# Patient Record
Sex: FEMALE | Race: WHITE | NOT HISPANIC OR LATINO | URBAN - NONMETROPOLITAN AREA
[De-identification: names, ages, dates, MRNs, and addresses within clinical notes are randomized per-mention and may not be internally consistent; named-entity substitution may affect disease eponyms.]

---

## 2017-06-20 PROBLEM — O34.219 PREVIOUS CESAREAN DELIVERY AFFECTING PREGNANCY: Status: ACTIVE | Noted: 2017-06-20

## 2017-06-20 PROBLEM — Z3A.39 39 WEEKS GESTATION OF PREGNANCY: Status: ACTIVE | Noted: 2017-06-20

## 2017-06-22 PROBLEM — O34.219 PREVIOUS CESAREAN DELIVERY AFFECTING PREGNANCY: Status: RESOLVED | Noted: 2017-06-20 | Resolved: 2017-06-22

## 2017-06-22 PROBLEM — Z3A.39 39 WEEKS GESTATION OF PREGNANCY: Status: RESOLVED | Noted: 2017-06-20 | Resolved: 2017-06-22

## 2023-02-01 DIAGNOSIS — M65.842 OTHER SYNOVITIS AND TENOSYNOVITIS, LEFT HAND: Primary | ICD-10-CM

## 2023-02-02 ENCOUNTER — CLINICAL SUPPORT (OUTPATIENT)
Dept: REHABILITATION | Facility: HOSPITAL | Age: 38
End: 2023-02-02
Payer: MEDICAID

## 2023-02-02 DIAGNOSIS — M65.842 OTHER SYNOVITIS AND TENOSYNOVITIS, LEFT HAND: Primary | ICD-10-CM

## 2023-02-02 PROCEDURE — 97166 OT EVAL MOD COMPLEX 45 MIN: CPT

## 2023-02-02 NOTE — PLAN OF CARE
"Patient: Allison Dailey  Date of Evaluation: 02/02/2023  Referring Physician: Damon Lou IV  Diagnosis:   Encounter Diagnosis   Name Primary?    Other synovitis and tenosynovitis, left hand Yes       Referral Orders: Eval and treat    Start Time: 11:00  End Time: 11:35  Total Time: 35 min    Age: 37 y.o.  Sex: female  Hand dominance: right    Occupation: Works at [employer]  Working presently: Yes    Date of Injury: Dec 22, 2023  Involved areas: left wrist    Mechanism of Injury: unknown  No past medical history on file.  Current Outpatient Medications   Medication Sig    naproxen (NAPROSYN) 500 MG tablet Take 1 tablet (500 mg total) by mouth every 8 (eight) hours as needed (cramping or mild pain 1-3/10 scale).    PRENATAL VIT CALC,IRON,FOLIC (PRENATAL VITAMIN ORAL) Take 1 tablet by mouth.     No current facility-administered medications for this visit.     Review of patient's allergies indicates:  No Known Allergies    Subjective  Allison reports "It hurts and limits my ROM"    Functional Pain Scale Rating 0-10: 2/10  Location: wrists  Description: Aching  Activities which increase pain: Extension and Flexing  Activities which decrease pain: relaxation    Allison's goals for therapy are: Decrease pain, Improve ROM, Improve strenght, Improve , and Improve functional hand use      Objective    Observation: Skin intact    Range of Motion: left Active    Wrist Ext/Flex with fingers flexed: 63/33  Wrist Ext/Flex with fingers extended: 20/75  Wrist RD/UD: WFL  Supination/Pronation:WFL  Elbow extension/flexion: WFL     Strength: (FRIDA Dynamometer in lbs.) Average 3 trials, Position II  Not tested due to pain    Fine Bella Coordination Tests:   9 hole Peg Test R) 11.9 sec   L) 15 sec    Quick Dash: 22.7%    Functional Limitations: Patient presents with the following functional Limitations:   Self Care / ADL: Dressing, Tying shoes, and Buttons/Fastners    Work/Activities: Gripping    Leisure: Crafts and Drumming "     Treatment included: OT evaluation and instruction in written HEP will be given on first treatment day      Assessment  Treatment Diagnosis/ Problem List LUE Decreased ROM, Decreased  strength, Decreased functional hand use, Increased pain, and Diminished/Impaired Coordination    Short Term Goals:   1) Patient to be IND with HEP and modalities for pain/edema managment.  2) Increase ROM 3-5 degrees to increase functional hand use for ADLs/work/leisure activities.  3) Increase  strength 3-5 lbs. to improve functional grasp for ADLs/work/leisure activities.  4) Decrease complaints of pain to  1 out of 10 to increase functional hand use for ADL/work/leisure activities.  5) Pt will increase ability to move/handle objects AEB decreased score on QuickDASH <=11.4 % (currently 22.7%)       Plan  Allison Dailey to be treated by Occupational Therapy 2 times per week for 6 weeks during the certification period from 2/6/2023 to 3/17/2023 to achieve the established goals.     Treatment to include: Paraffin, Fluidotherapy, Manual therapy/joint mobilizations, Modalities for pain management, US 3 mhz, Strengthening, Edema Control, Electrical Modalities, Joint Protection, and Energy Conservation, as well as any other treatments deemed necessary based on the patient's needs or progress.     I certify the need for these services furnished under this plan of treatment and while under my care.     ____________________________________                         __________________  Physician/Referring Practitioner                                               Date of Signature

## 2023-02-06 ENCOUNTER — CLINICAL SUPPORT (OUTPATIENT)
Dept: REHABILITATION | Facility: HOSPITAL | Age: 38
End: 2023-02-06
Payer: MEDICAID

## 2023-02-06 DIAGNOSIS — M65.842 OTHER SYNOVITIS AND TENOSYNOVITIS, LEFT HAND: Primary | ICD-10-CM

## 2023-02-06 PROCEDURE — 97530 THERAPEUTIC ACTIVITIES: CPT

## 2023-02-06 NOTE — PROGRESS NOTES
"Patient:  Allison Dailey  Clinic #:  [id number]   Date of Note: 02/06/2023   Referring Physician:  Order, Paper  Diagnosis:    Encounter Diagnosis   Name Primary?    Other synovitis and tenosynovitis, left hand Yes        Start Time: 2:00 pm  End Time: 3:00 pm  Total Time: 60 min      Subjective:   "Its the same"  Pain: 2 out of 10     Objective:   Patient seen by OT this session. Treatment  consist of the following:  Therapeutic activities    Treatment: Therapeutic activities x 60 min    Paraffin with HP x 10  Hand stretches in flexion and extension 3 x 5 for increased ROM  Wrist massage for increased ROM  CP x 10 for decreased swelling     Assessment:  Pt tolerated therapy well this day with increased rest breaks due to pain. Pt with no complaints of increased pain but was seen with facial grimacing during interventions. Pt skin intact without redness post modalities. Continue OT POC at next visit.  Pt will continue to benefit from skilled OT intervention.    Patient continues to demonstrate limitation  with  ROM, Joint mobility, Stiffness, Decreased fine motor coordination, Decreased functional use, Decreased strength, and Continued pain    Assessment  Treatment Diagnosis/ Problem List LUE Decreased ROM, Decreased  strength, Decreased functional hand use, Increased pain, and Diminished/Impaired Coordination     Short Term Goals:   1) Patient to be IND with HEP and modalities for pain/edema managment.  2) Increase ROM 3-5 degrees to increase functional hand use for ADLs/work/leisure activities.  3) Increase  strength 3-5 lbs. to improve functional grasp for ADLs/work/leisure activities.  4) Decrease complaints of pain to  1 out of 10 to increase functional hand use for ADL/work/leisure activities.  5) Pt will increase ability to move/handle objects AEB decreased score on QuickDASH <=11.4 % (currently 22.7%)         Plan  Allison Dailey to be treated by Occupational Therapy 2 times per week for 6 weeks during " the certification period from 2/6/2023 to 3/17/2023 to achieve the established goals.      Treatment to include: Paraffin, Fluidotherapy, Manual therapy/joint mobilizations, Modalities for pain management, US 3 mhz, Strengthening, Edema Control, Electrical Modalities, Joint Protection, and Energy Conservation, as well as any other treatments deemed necessary based on the patient's needs or progress.

## 2023-02-10 ENCOUNTER — CLINICAL SUPPORT (OUTPATIENT)
Dept: REHABILITATION | Facility: HOSPITAL | Age: 38
End: 2023-02-10
Payer: MEDICAID

## 2023-02-10 DIAGNOSIS — M65.842 OTHER SYNOVITIS AND TENOSYNOVITIS, LEFT HAND: Primary | ICD-10-CM

## 2023-02-10 PROCEDURE — 97530 THERAPEUTIC ACTIVITIES: CPT | Mod: CO

## 2023-02-10 NOTE — PROGRESS NOTES
"Patient:  Allison Dailey  [facility] #:  [id number]   Date of Note: 02/10/2023   Referring Physician:  Order, Paper  Diagnosis:    No diagnosis found.       Start Time: 10:00 am  End Time: 11:00 am  Total Time: 60 min      Subjective:   "No pain as long as I don't do to much with it."  Pain: 0 out of 10     Objective:   Patient seen by FORD this session. Treatment  consist of the following:  Therapeutic activities    Treatment: Therapeutic activities x 45 min and Manual Therapy x 15  - Patient completed wrist flexion and extension with MCP, PIP, and DIP in flexion seated for 2 x 10 reps to increase range of motion and tendon gliding.  -Patient completed prayer and reverse prayer stretch for 3 sec hold for 2 x 10 sets seated to increase range of motion in wrist and facilitate sheath gliding within tendons.  -Pt tolerated retrograde massage on dorsal side hand and wrist and for 15 min for reduction of edema in lymphatic system utilizing retrograde soft tissue massage to optimize range of motion and impact function.  -Patient performed 2 x 10 sets of tendon glides seated with visual demonstration to increase gliding of sheaths with in tendon and range of motion of wrist.   -Patient engaged in cryotherapy for 8 min while being educated on home exercise program and tendon glides ex/ax for pain relief, decreased edema,  decreased muscle spasms, decreased inflammation, decreased metabolic activity of tissues, and reduced nerve conduction velocity.      Assessment:  Patient reported no pain pre-session, however once she starts using it pain slightly increases. Patient reported during prayer and reverse prayer stretch, pain goes up to 7/10. FORD educated on staying within pain tolerance to not inflame the tendon worse. Patient unable to get to full range of motion during any exercise/activity. While patient on ice pack, she was educated on continuing home exercise program and adding tendon glides. Tendon glides hand out given to " patient. Patient shows good carry over skills and compliance with home exercise program and tendon glides. Pt skin intact without redness post modalities. Continue OT POC at next visit.    Pt will continue to benefit from skilled OT intervention.    Patient continues to demonstrate limitation  with  ROM, Joint mobility, Stiffness, Decreased fine motor coordination, Decreased functional use, Decreased strength, and Continued pain    Assessment  Treatment Diagnosis/ Problem List LUE Decreased ROM, Decreased  strength, Decreased functional hand use, Increased pain, and Diminished/Impaired Coordination     Short Term Goals:   1) Patient to be IND with HEP and modalities for pain/edema managment.  2) Increase ROM 3-5 degrees to increase functional hand use for ADLs/work/leisure activities.  3) Increase  strength 3-5 lbs. to improve functional grasp for ADLs/work/leisure activities.  4) Decrease complaints of pain to  1 out of 10 to increase functional hand use for ADL/work/leisure activities.  5) Pt will increase ability to move/handle objects AEB decreased score on QuickDASH <=11.4 % (currently 22.7%)        Plan  Allison Dailey to be treated by Occupational Therapy 2 times per week for 6 weeks during the certification period from 2/6/2023 to 3/17/2023 to achieve the established goals.      Treatment to include: Paraffin, Fluidotherapy, Manual therapy/joint mobilizations, Modalities for pain management, US 3 mhz, Strengthening, Edema Control, Electrical Modalities, Joint Protection, and Energy Conservation, as well as any other treatments deemed necessary based on the patient's needs or progress.

## 2023-02-13 ENCOUNTER — CLINICAL SUPPORT (OUTPATIENT)
Dept: REHABILITATION | Facility: HOSPITAL | Age: 38
End: 2023-02-13
Payer: MEDICAID

## 2023-02-13 DIAGNOSIS — M65.842 OTHER SYNOVITIS AND TENOSYNOVITIS, LEFT HAND: Primary | ICD-10-CM

## 2023-02-13 PROCEDURE — 97530 THERAPEUTIC ACTIVITIES: CPT | Mod: CO

## 2023-02-13 NOTE — PROGRESS NOTES
"Patient:  Allison Dailey  [facility] #:  [id number]   Date of Note: 02/13/2023   Referring Physician:  Order, Paper  Diagnosis:    No diagnosis found.       Start Time: 10:00 am  End Time: 11:05 am  Total Time: 65 min      Subjective:   "Its good"  Pain: 1 out of 10     Objective:   Patient seen by FORD this session. Treatment  consist of the following:  Therapeutic activities    Treatment: Therapeutic activities x 45 min and Manual Therapy x 15  - Applied paraffin to (L) hand to increase muscle elasticity and improve blood flow prior to beginning PROM and therapeutic interventions   - Patient completed wrist flexion and extension with MCP, PIP, and DIP in flexion seated for 2 x 10 reps to increase range of motion and tendon gliding.  -Patient completed prayer and reverse prayer stretch for 3 sec hold for 2 x 10 sets seated to increase range of motion in wrist and facilitate sheath gliding within tendons.  -Pt tolerated retrograde massage on dorsal side hand and wrist and for 15 min for reduction of edema in lymphatic system utilizing retrograde soft tissue massage to optimize range of motion and impact function.  -Patient performed 2 x 10 sets of tendon glides seated with visual demonstration to increase gliding of sheaths with in tendon and range of motion of wrist.        Assessment:  Patient tolerated session well this day. Patient reported very little pain this day. Patient continues to report the worse pain is during reverse prayer stretch this day. Patient verbally stated pain goes up to 7/10 during that stretch. Patient unable to get full range of motion during any stretches this day. Patient re-educated on home exercise program and the importance of prolonged stretch to increase range of motion. FORD measured during passive wrist extension, patient was able to reach 82 degrees; and with active wrist extension, patient was able to get to 55 degrees. Patient range of motion increased slowly from evaluation. Patient " shows good carry over skills and compliance with home exercise program and tendon glides. Pt skin intact without redness post modalities. Continue OT POC at next visit. Occupational therapist /FORD collaboration to discuss POC, improvements, and d/c planning on todays date.      Pt will continue to benefit from skilled OT intervention.    Patient continues to demonstrate limitation  with  ROM, Joint mobility, Stiffness, Decreased fine motor coordination, Decreased functional use, Decreased strength, and Continued pain    Assessment  Treatment Diagnosis/ Problem List LUE Decreased ROM, Decreased  strength, Decreased functional hand use, Increased pain, and Diminished/Impaired Coordination     Short Term Goals:   1) Patient to be IND with HEP and modalities for pain/edema managment.  2) Increase ROM 3-5 degrees to increase functional hand use for ADLs/work/leisure activities.  3) Increase  strength 3-5 lbs. to improve functional grasp for ADLs/work/leisure activities.  4) Decrease complaints of pain to  1 out of 10 to increase functional hand use for ADL/work/leisure activities.  5) Pt will increase ability to move/handle objects AEB decreased score on QuickDASH <=11.4 % (currently 22.7%)        Plan  Allison Dailey to be treated by Occupational Therapy 2 times per week for 6 weeks during the certification period from 2/6/2023 to 3/17/2023 to achieve the established goals.      Treatment to include: Paraffin, Fluidotherapy, Manual therapy/joint mobilizations, Modalities for pain management, US 3 mhz, Strengthening, Edema Control, Electrical Modalities, Joint Protection, and Energy Conservation, as well as any other treatments deemed necessary based on the patient's needs or progress.

## 2023-02-15 ENCOUNTER — CLINICAL SUPPORT (OUTPATIENT)
Dept: REHABILITATION | Facility: HOSPITAL | Age: 38
End: 2023-02-15
Payer: MEDICAID

## 2023-02-15 DIAGNOSIS — M65.842 OTHER SYNOVITIS AND TENOSYNOVITIS, LEFT HAND: Primary | ICD-10-CM

## 2023-02-15 PROCEDURE — 97530 THERAPEUTIC ACTIVITIES: CPT

## 2023-02-15 NOTE — PROGRESS NOTES
"Patient:  Allison Dailey  [facility] #:  [id number]   Date of Note: 02/15/2023   Referring Physician:  Order, Paper  Diagnosis:    No diagnosis found.       Start Time: 10:00 am  End Time: 11:00 am  Total Time: 60 min      Subjective:   "No Pain"  Pain: 1 out of 10     Objective:   Patient seen by OT this session. Treatment  consist of the following:  Therapeutic activities    Treatment: Therapeutic activities x 60 min     - Patient completed wrist flexion and extension with MCP, PIP, and DIP in flexion seated for 2 x 10 reps to increase range of motion and tendon gliding.  -Patient completed prayer and reverse prayer stretch for 3 sec hold for 2 x 10 sets seated to increase range of motion in wrist and facilitate sheath gliding within tendons.  -Pt tolerated retrograde massage on dorsal side hand and wrist and for 15 min for reduction of edema in lymphatic system utilizing retrograde soft tissue massage to optimize range of motion and impact function.  -Patient performed 2 x 10 sets of tendon glides seated with visual demonstration to increase gliding of sheaths with in tendon and range of motion of wrist.   -Cold pack while completing hand ROM     Assessment:  Patient tolerated session well this day. Patient unable to get full range of motion during any stretches this day. Patient shows good carry over skills and compliance with home exercise program and tendon glides. Pt skin intact without redness post modalities. Continue OT POC at next visit.  Pt will continue to benefit from skilled OT intervention.    Patient continues to demonstrate limitation  with  ROM, Joint mobility, Stiffness, Decreased fine motor coordination, Decreased functional use, Decreased strength, and Continued pain    Assessment  Treatment Diagnosis/ Problem List LUE Decreased ROM, Decreased  strength, Decreased functional hand use, Increased pain, and Diminished/Impaired Coordination     Short Term Goals:   1) Patient to be IND with HEP and " modalities for pain/edema managment.  2) Increase ROM 3-5 degrees to increase functional hand use for ADLs/work/leisure activities.  3) Increase  strength 3-5 lbs. to improve functional grasp for ADLs/work/leisure activities.  4) Decrease complaints of pain to  1 out of 10 to increase functional hand use for ADL/work/leisure activities.  5) Pt will increase ability to move/handle objects AEB decreased score on QuickDASH <=11.4 % (currently 22.7%)        Plan  Allison Dailey to be treated by Occupational Therapy 2 times per week for 6 weeks during the certification period from 2/6/2023 to 3/17/2023 to achieve the established goals.      Treatment to include: Paraffin, Fluidotherapy, Manual therapy/joint mobilizations, Modalities for pain management, US 3 mhz, Strengthening, Edema Control, Electrical Modalities, Joint Protection, and Energy Conservation, as well as any other treatments deemed necessary based on the patient's needs or progress.

## 2023-02-20 ENCOUNTER — CLINICAL SUPPORT (OUTPATIENT)
Dept: REHABILITATION | Facility: HOSPITAL | Age: 38
End: 2023-02-20
Payer: MEDICAID

## 2023-02-20 DIAGNOSIS — M65.842 OTHER SYNOVITIS AND TENOSYNOVITIS, LEFT HAND: Primary | ICD-10-CM

## 2023-02-20 PROCEDURE — 97530 THERAPEUTIC ACTIVITIES: CPT | Mod: CO

## 2023-02-20 NOTE — PROGRESS NOTES
"Patient:  Allison Dailey  [facility] #:  [id number]   Date of Note: 02/20/2023   Referring Physician:  Order, Paper  Diagnosis:    No diagnosis found.       Start Time: 9:55 am  End Time: 10:56 am  Total Time: 61 min      Subjective:   "It's okay"  Pain: 1 out of 10     Objective:   Patient seen by LILIANA this session. Treatment  consist of the following:  Therapeutic activities    Treatment: Therapeutic activities x 11 min, Therapeutic exercise x 35 min and Manual Therapy x 15 min    - Patient completed active range of motion wrist flexion and extension with MCP, PIP, and DIP in flexion seated for 3 x 10 reps to increase range of motion and tendon gliding.  -patient tolerated passive range of motion wrist flexion with MCP, PIP, and DIP in flexion and wrist extension with digits extended for 3 x 10 sets with ~3 sec hold to increase range of motion.   -Patient completed prayer and reverse prayer stretch for 3 sec hold for 3 x 10 sets seated to increase range of motion in wrist and facilitate sheath gliding within tendons.  -Pt tolerated retrograde massage on dorsal side hand and wrist and for 15 min for reduction of edema in lymphatic system utilizing retrograde soft tissue massage to optimize range of motion and impact function.  -Patient performed 3 x 10 sets of tendon glides seated with visual demonstration to increase gliding of sheaths with in tendon and range of motion of wrist.   -Patient completed single digit raises for 3 x 10 sets to increase range of motion and strength.  -Cold pack while completing hand ROM     Assessment:    Patient tolerated session well this day with little increase of pain. Patient continues to report hardest stretch is reverse prayer stretch again this day. Patient unable to get full range of motion during wrist flexion with digits flexed and wrist extension with digits in extension. End feels or semi-hard. Patient shows good carry over skills and compliance with home exercise program and " tendon glides. Patient reports some relief with ice pack post session. Pt skin intact without redness post modalities. Continue OT POC at next visit.    Pt will continue to benefit from skilled OT intervention. Patient continues to demonstrate limitation  with  ROM, Joint mobility, Stiffness, Decreased fine motor coordination, Decreased functional use, Decreased strength, and Continued pain    Assessment  Treatment Diagnosis/ Problem List LUE Decreased ROM, Decreased  strength, Decreased functional hand use, Increased pain, and Diminished/Impaired Coordination     Short Term Goals:   1) Patient to be IND with HEP and modalities for pain/edema managment.  2) Increase ROM 3-5 degrees to increase functional hand use for ADLs/work/leisure activities.  3) Increase  strength 3-5 lbs. to improve functional grasp for ADLs/work/leisure activities.  4) Decrease complaints of pain to  1 out of 10 to increase functional hand use for ADL/work/leisure activities.  5) Pt will increase ability to move/handle objects AEB decreased score on QuickDASH <=11.4 % (currently 22.7%)        Plan  Allison Dailey to be treated by Occupational Therapy 2 times per week for 6 weeks during the certification period from 2/6/2023 to 3/17/2023 to achieve the established goals.      Treatment to include: Paraffin, Fluidotherapy, Manual therapy/joint mobilizations, Modalities for pain management, US 3 mhz, Strengthening, Edema Control, Electrical Modalities, Joint Protection, and Energy Conservation, as well as any other treatments deemed necessary based on the patient's needs or progress.

## 2023-02-22 ENCOUNTER — TELEPHONE (OUTPATIENT)
Dept: REHABILITATION | Facility: HOSPITAL | Age: 38
End: 2023-02-22
Payer: MEDICAID

## 2023-02-23 ENCOUNTER — CLINICAL SUPPORT (OUTPATIENT)
Dept: REHABILITATION | Facility: HOSPITAL | Age: 38
End: 2023-02-23
Payer: MEDICAID

## 2023-02-23 DIAGNOSIS — M65.842 OTHER SYNOVITIS AND TENOSYNOVITIS, LEFT HAND: Primary | ICD-10-CM

## 2023-02-23 PROCEDURE — 97530 THERAPEUTIC ACTIVITIES: CPT | Mod: CO

## 2023-02-23 NOTE — PROGRESS NOTES
"Patient:  Allison Dailey  [facility] #:  [id number]   Date of Note: 02/23/2023   Referring Physician:  Order, Paper  Diagnosis:    No diagnosis found.       Start Time: 9:56 am  End Time: 10:56 am  Total Time: 60 min      Subjective:   "It's feeling good today. I have a MD apt Monday."  Pain: 1 out of 10     Objective:   Patient seen by LILIANA this session. Treatment  consist of the following:  Therapeutic activities    Treatment: Therapeutic activities x 11 min, Therapeutic exercise x 34 min and Manual Therapy x 15 min    - Patient completed active range of motion wrist flexion and extension with MCP, PIP, and DIP in flexion seated for 3 x 10 reps to increase range of motion and tendon gliding.  -patient tolerated passive range of motion wrist flexion with MCP, PIP, and DIP in flexion and wrist extension with digits extended for 3 x 10 sets with ~3 sec hold to increase range of motion.   -Patient completed prayer and reverse prayer stretch for 3 sec hold for 3 x 10 sets seated to increase range of motion in wrist and facilitate sheath gliding within tendons.  -Pt tolerated retrograde massage on dorsal side hand and wrist and for 15 min for reduction of edema in lymphatic system utilizing retrograde soft tissue massage to optimize range of motion and impact function.  -Patient performed 3 x 10 sets of tendon glides seated with visual demonstration to increase gliding of sheaths with in tendon and range of motion of wrist.   -Patient completed single digit raises for 3 x 10 sets to increase range of motion and strength.  -Patient engaged in small swiss ball rolls for 3 x 10 reps with 5 sec hold at end range to increase range of motion.       Assessment:    Patient reported she has 1/10 pain today, however its more of a ache not pain. Patient also reports she has a MD apt Monday with ortho. Patient continues to report worse exercise this day was reverse prayer stretch, however patient reported more discomfort with wrist flexion " when all digits, MCP, PIP, and DIP joints are flexed this day. Patient reported she is also more stiff today and she could have slept wrong. Patient continues to have semi-tight end feels. Patient reported swiss ball rolls felt good and she could feel a slight pull. Patient shows good carry over skills and compliance with home exercise program and tendon glides. Patient request Biofreeze post session this day to help with pain and soreness. Pt skin intact without redness post modalities. Continue OT POC at next visit. Occupational therapist /FORD collaboration to discuss POC, improvements, and d/c planning on todays date.      Pt will continue to benefit from skilled OT intervention. Patient continues to demonstrate limitation  with  ROM, Joint mobility, Stiffness, Decreased fine motor coordination, Decreased functional use, Decreased strength, and Continued pain    Assessment  Treatment Diagnosis/ Problem List LUE Decreased ROM, Decreased  strength, Decreased functional hand use, Increased pain, and Diminished/Impaired Coordination     Short Term Goals:   1) Patient to be IND with HEP and modalities for pain/edema managment.  2) Increase ROM 3-5 degrees to increase functional hand use for ADLs/work/leisure activities.  3) Increase  strength 3-5 lbs. to improve functional grasp for ADLs/work/leisure activities.  4) Decrease complaints of pain to  1 out of 10 to increase functional hand use for ADL/work/leisure activities.  5) Pt will increase ability to move/handle objects AEB decreased score on QuickDASH <=11.4 % (currently 22.7%)        Plan  Allison Dailey to be treated by Occupational Therapy 2 times per week for 6 weeks during the certification period from 2/6/2023 to 3/17/2023 to achieve the established goals.      Treatment to include: Paraffin, Fluidotherapy, Manual therapy/joint mobilizations, Modalities for pain management, US 3 mhz, Strengthening, Edema Control, Electrical Modalities, Joint  Protection, and Energy Conservation, as well as any other treatments deemed necessary based on the patient's needs or progress.     Kenyatta FORD 2/23/23.

## 2023-02-27 ENCOUNTER — CLINICAL SUPPORT (OUTPATIENT)
Dept: REHABILITATION | Facility: HOSPITAL | Age: 38
End: 2023-02-27
Payer: MEDICAID

## 2023-02-27 DIAGNOSIS — M65.842 OTHER SYNOVITIS AND TENOSYNOVITIS, LEFT HAND: Primary | ICD-10-CM

## 2023-02-27 PROCEDURE — 97530 THERAPEUTIC ACTIVITIES: CPT | Mod: CO

## 2023-02-27 NOTE — PROGRESS NOTES
"Patient:  Allison Dailey  [facility] #:  [id number]   Date of Note: 02/27/2023   Referring Physician:  Order, Paper  Diagnosis:    Encounter Diagnosis   Name Primary?    Other synovitis and tenosynovitis, left hand Yes     Start Time: 10:00 am  End Time: 11:00 am  Total Time: 60 min    Subjective:   "Doctor apt went good."  Pain: 1 out of 10     Objective:   Patient seen by LILIANA this session. Treatment  consist of the following:  Therapeutic activities    Treatment: Therapeutic activities x 60 min  - Patient completed active range of motion wrist flexion and extension with MCP, PIP, and DIP in flexion seated for 3 x 10 reps to increase range of motion and tendon gliding.  -patient tolerated passive range of motion wrist flexion with MCP, PIP, and DIP in flexion and wrist extension with digits extended for 3 x 10 sets with ~3 sec hold to increase range of motion.   -Patient completed prayer and reverse prayer stretch for 3 sec hold for 3 x 10 sets seated to increase range of motion in wrist and facilitate sheath gliding within tendons.  -Pt tolerated retrograde massage on dorsal side hand and wrist and for 15 min for reduction of edema in lymphatic system utilizing retrograde soft tissue massage to optimize range of motion and impact function.  -Patient performed 3 x 10 sets of tendon glides seated with visual demonstration to increase gliding of sheaths with in tendon and range of motion of wrist.   -Patient completed single digit raises for 3 x 10 sets to increase range of motion and strength.  -Patient engaged in small swiss ball rolls for 3 x 10 reps with 10 sec hold at end range to increase range of motion.  -Patient completed prolonged wrist extension with 2 pound swiss ball over wedge  for 3 x 10 with 10 sec hold to increase range of motion. Patient educated to try to keep digits in extension while holding on the ball.      Assessment:    Patient tolerated session well this day. Patient reports more of an ache not " really a pain. Patient reported the MD said it's a lot of fluid, MD wants her to see a rheumatologist. Post manual massage and retrograde massage and some stretching, patient able to reach 55 degrees active of wrist flexion with digits flexed. Patient able to reach 34 degrees of active wrist extension with digits in extension. Subjectively per FORD that is getting a lot better even from last week. Patient reports manual massage helps with range of motion to decrease stiffness. Patient also reports exercise/activity getting easier. Patient educated to rub it at home to continue to decrease the swelling and stiffness. atient shows good carry over skills and compliance with home exercise program and tendon glides. Patient skin intact without redness post modalities. Continue OT POC at next visit. Occupational therapist /FORD collaboration to discuss POC, improvements, and d/c planning on todays date.    Pt will continue to benefit from skilled OT intervention. Patient continues to demonstrate limitation  with  ROM, Joint mobility, Stiffness, Decreased fine motor coordination, Decreased functional use, Decreased strength, and Continued pain    Assessment  Treatment Diagnosis/ Problem List LUE Decreased ROM, Decreased  strength, Decreased functional hand use, Increased pain, and Diminished/Impaired Coordination     Short Term Goals:   1) Patient to be IND with HEP and modalities for pain/edema managment.  2) Increase ROM 3-5 degrees to increase functional hand use for ADLs/work/leisure activities.  3) Increase  strength 3-5 lbs. to improve functional grasp for ADLs/work/leisure activities.  4) Decrease complaints of pain to  1 out of 10 to increase functional hand use for ADL/work/leisure activities.  5) Pt will increase ability to move/handle objects AEB decreased score on QuickDASH <=11.4 % (currently 22.7%)        Plan  Allison Daiely to be treated by Occupational Therapy 2 times per week for 6 weeks during the  certification period from 2/6/2023 to 3/17/2023 to achieve the established goals.      Treatment to include: Paraffin, Fluidotherapy, Manual therapy/joint mobilizations, Modalities for pain management, US 3 mhz, Strengthening, Edema Control, Electrical Modalities, Joint Protection, and Energy Conservation, as well as any other treatments deemed necessary based on the patient's needs or progress.

## 2023-03-02 ENCOUNTER — CLINICAL SUPPORT (OUTPATIENT)
Dept: REHABILITATION | Facility: HOSPITAL | Age: 38
End: 2023-03-02
Payer: MEDICAID

## 2023-03-02 DIAGNOSIS — M65.842 OTHER SYNOVITIS AND TENOSYNOVITIS, LEFT HAND: Primary | ICD-10-CM

## 2023-03-02 PROCEDURE — 97530 THERAPEUTIC ACTIVITIES: CPT | Mod: CO

## 2023-03-06 ENCOUNTER — CLINICAL SUPPORT (OUTPATIENT)
Dept: REHABILITATION | Facility: HOSPITAL | Age: 38
End: 2023-03-06
Payer: MEDICAID

## 2023-03-06 DIAGNOSIS — M65.842 OTHER SYNOVITIS AND TENOSYNOVITIS, LEFT HAND: Primary | ICD-10-CM

## 2023-03-06 PROCEDURE — 97530 THERAPEUTIC ACTIVITIES: CPT

## 2023-03-06 NOTE — PROGRESS NOTES
"Patient:  Allison Dailey  [facility] #:  [id number]   Date of Note: 03/06/2023   Referring Physician:  Order, Paper  Diagnosis:    Encounter Diagnosis   Name Primary?    Other synovitis and tenosynovitis, left hand Yes       Start Time: 10:00 pm  End Time: 11:00 pm  Total Time: 60 min    Subjective:   "Its ok"  Pain: 1 out of 10     Objective:   Patient seen by OT this session. Treatment  consist of the following:  Therapeutic activities    Treatment: Therapeutic activities x 60 min  - Patient completed active range of motion wrist flexion and extension with MCP, PIP, and DIP in flexion seated for 3 x 10 reps to increase range of motion and tendon gliding.  -patient tolerated passive range of motion wrist flexion with MCP, PIP, and DIP in flexion and wrist extension with digits extended for 3 x 10 sets with ~3 sec hold to increase range of motion.   -Patient completed prayer and reverse prayer stretch for 3 sec hold for 3 x 10 sets seated to increase range of motion in wrist and facilitate sheath gliding within tendons.  -Pt tolerated retrograde massage on dorsal side hand and wrist and for 15 min for reduction of edema in lymphatic system utilizing retrograde soft tissue massage to optimize range of motion and impact function.  -Patient performed 3 x 10 sets of tendon glides seated with visual demonstration to increase gliding of sheaths with in tendon and range of motion of wrist.   -Patient completed single digit raises with extension tool for 3 x 10 sets to increase range of motion and strength.  -Patient engaged in small swiss ball rolls for 3 x 10 reps with 10 sec hold at end range to increase range of motion.  -Patient completed prolonged wrist extension with 2 pound swiss ball over wedge  for 3 x 10 with 10 sec hold to increase range of motion. Patient educated to try to keep digits in extension while holding on the ball.   -Patient engaged in prolonged wrist extension and flexion on table top with 10 sec hold for 3 " "x 10 sets to increase range of motion.      Assessment:    Patient tolerated session well this day. Patient reported ball stretches cause discomfort but "its ok". Patient shows good carry over skills and compliance with home exercise program and tendon glides. Patient skin intact without redness post modalities. Continue OT POC at next visit. Occupational therapist /FORD collaboration to discuss POC, improvements, and d/c planning on todays date.    Pt will continue to benefit from skilled OT intervention. Patient continues to demonstrate limitation  with  ROM, Joint mobility, Stiffness, Decreased fine motor coordination, Decreased functional use, Decreased strength, and Continued pain    Assessment  Treatment Diagnosis/ Problem List LUE Decreased ROM, Decreased  strength, Decreased functional hand use, Increased pain, and Diminished/Impaired Coordination     Short Term Goals:   1) Patient to be IND with HEP and modalities for pain/edema managment.  2) Increase ROM 3-5 degrees to increase functional hand use for ADLs/work/leisure activities.  3) Increase  strength 3-5 lbs. to improve functional grasp for ADLs/work/leisure activities.  4) Decrease complaints of pain to  1 out of 10 to increase functional hand use for ADL/work/leisure activities.  5) Pt will increase ability to move/handle objects AEB decreased score on QuickDASH <=11.4 % (currently 22.7%)        Plan  Allison Dailey to be treated by Occupational Therapy 2 times per week for 6 weeks during the certification period from 2/6/2023 to 3/17/2023 to achieve the established goals.      Treatment to include: Paraffin, Fluidotherapy, Manual therapy/joint mobilizations, Modalities for pain management, US 3 mhz, Strengthening, Edema Control, Electrical Modalities, Joint Protection, and Energy Conservation, as well as any other treatments deemed necessary based on the patient's needs or progress.       "

## 2023-03-08 ENCOUNTER — CLINICAL SUPPORT (OUTPATIENT)
Dept: REHABILITATION | Facility: HOSPITAL | Age: 38
End: 2023-03-08
Payer: MEDICAID

## 2023-03-08 DIAGNOSIS — M65.842 OTHER SYNOVITIS AND TENOSYNOVITIS, LEFT HAND: Primary | ICD-10-CM

## 2023-03-08 PROCEDURE — 97530 THERAPEUTIC ACTIVITIES: CPT | Mod: CO

## 2023-03-08 NOTE — PROGRESS NOTES
"Patient:  Allison Dailey  [facility] #:  [id number]   Date of Note: 03/08/2023   Referring Physician:  Order, Paper  Diagnosis:    Encounter Diagnosis   Name Primary?    Other synovitis and tenosynovitis, left hand Yes       Start Time: 10:00 pm  End Time: 11:00 pm  Total Time: 60 min    Subjective:   "Its good today. No pain"  Pain: 0 out of 10     Objective:   Patient seen by LILIANA this session. Treatment  consist of the following:  Therapeutic activities    Treatment: Therapeutic activities x 10 min, Therapeutic exercise x 35 min, and manual Therapy x 15 min    - Patient completed active range of motion wrist flexion and extension with MCP, PIP, and DIP in flexion seated for 3 x 10 reps to increase range of motion and tendon gliding.  -patient tolerated passive range of motion wrist flexion with MCP, PIP, and DIP in flexion and wrist extension with digits extended for 3 x 10 sets with ~3 sec hold to increase range of motion.   -Patient completed prayer and reverse prayer stretch for 3 sec hold for 3 x 10 sets seated to increase range of motion in wrist and facilitate sheath gliding within tendons.  -Pt tolerated retrograde massage on dorsal side hand and wrist and for 15 min for reduction of edema in lymphatic system utilizing retrograde soft tissue massage to optimize range of motion and impact function.  -Patient performed 3 x 10 sets of tendon glides seated with visual demonstration to increase gliding of sheaths with in tendon and range of motion of wrist.   -Patient completed single digit raises with extension tool for 3 x 10 sets to increase range of motion and strength.  -Patient engaged in small swiss ball rolls for 3 x 10 reps with 10 sec hold at end range to increase range of motion.  -Patient completed prolonged wrist extension with 2 pound swiss ball over wedge  for 3 x 10 with 10 sec hold to increase range of motion. Patient educated to try to keep digits in extension while holding on the ball.   -Patient " performed digi-extend with single digit extension at a time for 2 x 15 to increase strength.  -Patient engaged green digi-flex with single digit flexion at a time for 2 x 15 to increase strength.      Assessment:    Patient tolerated session again this day with no reports of increase in pain. Patient completed digi flex and extend with minimal difficulty reported per patient. She reported a little soreness in forearm extenders from digi-extension. Patient requested Biofreeze post session again this day to relief muscle soreness and pain. Patient skin intact without redness post modalities. Continue OT POC at next visit. Occupational therapist /FORD collaboration to discuss POC, improvements, and d/c planning on todays date.    Pt will continue to benefit from skilled OT intervention. Patient continues to demonstrate limitation  with  ROM, Joint mobility, Stiffness, Decreased fine motor coordination, Decreased functional use, Decreased strength, and Continued pain    Assessment  Treatment Diagnosis/ Problem List LUE Decreased ROM, Decreased  strength, Decreased functional hand use, Increased pain, and Diminished/Impaired Coordination     Short Term Goals:   1) Patient to be IND with HEP and modalities for pain/edema managment.  2) Increase ROM 3-5 degrees to increase functional hand use for ADLs/work/leisure activities.  3) Increase  strength 3-5 lbs. to improve functional grasp for ADLs/work/leisure activities.  4) Decrease complaints of pain to  1 out of 10 to increase functional hand use for ADL/work/leisure activities.  5) Pt will increase ability to move/handle objects AEB decreased score on QuickDASH <=11.4 % (currently 22.7%)        Plan  Allison Dailey to be treated by Occupational Therapy 2 times per week for 6 weeks during the certification period from 2/6/2023 to 3/17/2023 to achieve the established goals.      Treatment to include: Paraffin, Fluidotherapy, Manual therapy/joint mobilizations,  Modalities for pain management, US 3 mhz, Strengthening, Edema Control, Electrical Modalities, Joint Protection, and Energy Conservation, as well as any other treatments deemed necessary based on the patient's needs or progress.

## 2023-03-13 ENCOUNTER — CLINICAL SUPPORT (OUTPATIENT)
Dept: REHABILITATION | Facility: HOSPITAL | Age: 38
End: 2023-03-13
Payer: MEDICAID

## 2023-03-13 DIAGNOSIS — M65.842 OTHER SYNOVITIS AND TENOSYNOVITIS, LEFT HAND: Primary | ICD-10-CM

## 2023-03-13 PROCEDURE — 97530 THERAPEUTIC ACTIVITIES: CPT | Mod: CO

## 2023-03-13 NOTE — PROGRESS NOTES
"Patient:  Allison Dailey  [facility] #:  [id number]   Date of Note: 03/13/2023   Referring Physician:  Order, Paper  Diagnosis:    Encounter Diagnosis   Name Primary?    Other synovitis and tenosynovitis, left hand Yes       Start Time: 10:00 pm  End Time: 10:58 pm  Total Time: 58 min    Subjective:   "Its good today. No pain"  Pain: 0 out of 10     Objective:   Patient seen by LILIANA this session. Treatment  consist of the following: Therapeutic activities    Treatment: Therapeutic activities x 15 min, Therapeutic exercise x 28 min, and manual Therapy x 15 min    - Patient completed active range of motion wrist flexion and extension with MCP, PIP, and DIP in flexion seated for 3 x 10 reps to increase range of motion and tendon gliding.  -patient tolerated passive range of motion wrist flexion with MCP, PIP, and DIP in flexion and wrist extension with digits extended for 3 x 10 sets with ~3 sec hold to increase range of motion.   -Patient completed prayer and reverse prayer stretch for 3 sec hold for 3 x 10 sets seated to increase range of motion in wrist and facilitate sheath gliding within tendons.  -Pt tolerated retrograde massage on dorsal side hand and wrist and for 15 min for reduction of edema in lymphatic system utilizing retrograde soft tissue massage to optimize range of motion and impact function.  -Patient performed 3 x 10 sets of tendon glides seated with visual demonstration to increase gliding of sheaths with in tendon and range of motion of wrist.   -Patient completed single digit raises with extension tool for 3 x 10 sets to increase range of motion and strength.  -Patient engaged in small swiss ball rolls for 3 x 10 reps with 10 sec hold at end range to increase range of motion.  -Patient completed prolonged wrist extension with 2 pound swiss ball over wedge  for 3 x 10 with 10 sec hold to increase range of motion. Patient educated to try to keep digits in extension while holding on the ball.   -Patient " performed digi-extend with single digit extension at a time for 2 x 15 to increase strength.  -Patient engaged green digi-flex with single digit flexion at a time for 2 x 15 to increase strength.      Assessment:    Patient tolerated session again this day with no reports of increase in pain. Patient completed digi flex and extend with minimal difficulty reported per patient. Patient requested Biofreeze post session again this day to relief muscle soreness and pain. Patient skin intact without redness post modalities. Continue OT POC at next visit. Occupational therapist /FORD collaboration to discuss POC, improvements, and d/c planning on todays date.    Pt will continue to benefit from skilled OT intervention. Patient continues to demonstrate limitation  with  ROM, Joint mobility, Stiffness, Decreased fine motor coordination, Decreased functional use, Decreased strength, and Continued pain    Assessment  Treatment Diagnosis/ Problem List LUE Decreased ROM, Decreased  strength, Decreased functional hand use, Increased pain, and Diminished/Impaired Coordination     Short Term Goals:   1) Patient to be IND with HEP and modalities for pain/edema managment.  2) Increase ROM 3-5 degrees to increase functional hand use for ADLs/work/leisure activities.  3) Increase  strength 3-5 lbs. to improve functional grasp for ADLs/work/leisure activities.  4) Decrease complaints of pain to  1 out of 10 to increase functional hand use for ADL/work/leisure activities.  5) Pt will increase ability to move/handle objects AEB decreased score on QuickDASH <=11.4 % (currently 22.7%)        Plan  Allison Dailey to be treated by Occupational Therapy 2 times per week for 6 weeks during the certification period from 2/6/2023 to 3/17/2023 to achieve the established goals.      Treatment to include: Paraffin, Fluidotherapy, Manual therapy/joint mobilizations, Modalities for pain management, US 3 mhz, Strengthening, Edema Control,  Electrical Modalities, Joint Protection, and Energy Conservation, as well as any other treatments deemed necessary based on the patient's needs or progress.

## 2023-03-16 ENCOUNTER — CLINICAL SUPPORT (OUTPATIENT)
Dept: REHABILITATION | Facility: HOSPITAL | Age: 38
End: 2023-03-16
Payer: MEDICAID

## 2023-03-16 DIAGNOSIS — M65.842 OTHER SYNOVITIS AND TENOSYNOVITIS, LEFT HAND: Primary | ICD-10-CM

## 2023-03-16 PROCEDURE — 97530 THERAPEUTIC ACTIVITIES: CPT

## 2023-03-16 NOTE — PROGRESS NOTES
"Patient:  Allison Dailey  [facility] #:  [id number]   Date of Note: 03/16/2023   Referring Physician:  Order, Paper  Diagnosis:    Encounter Diagnosis   Name Primary?    Other synovitis and tenosynovitis, left hand Yes       Discharge/Treatment Note    Start Time: 10:00 pm  End Time: 10:59 pm  Total Time: 59 min    Subjective:   "Its good today. No pain"  Pain: 0 out of 10     Objective:   Patient seen by OT this session. Treatment  consist of the following: Therapeutic activities    Treatment: Therapeutic activities x 59 min    - Patient completed active range of motion wrist flexion and extension with MCP, PIP, and DIP in flexion seated for 3 x 10 reps to increase range of motion and tendon gliding.  -Patient completed prayer and reverse prayer stretch for 3 sec hold for 3 x 10 sets seated to increase range of motion in wrist and facilitate sheath gliding within tendons.  -Pt tolerated retrograde massage on dorsal side hand and wrist and for 15 min for reduction of edema in lymphatic system utilizing retrograde soft tissue massage to optimize range of motion and impact function.  -Patient completed single digit raises with extension tool for 3 x 10 sets to increase range of motion and strength.  -Patient engaged in small swiss ball rolls for 3 x 10 reps with 10 sec hold at end range to increase range of motion.  -Patient completed prolonged wrist extension with 2 pound swiss ball over wedge  for 3 x 10 with 10 sec hold to increase range of motion. Patient educated to try to keep digits in extension while holding on the ball.   -Patient performed digi-extend with single digit extension at a time for 2 x 15 to increase strength.  -Patient engaged green digi-flex with single digit flexion at a time for 2 x 15 to increase strength.     New Measurements:     Range of Motion: left Active     Wrist Ext/Flex with fingers flexed: 63/33 70/70  Wrist Ext/Flex with fingers extended: 20/75 50/90  Wrist RD/UD: " WFL  Supination/Pronation:WFL  Elbow extension/flexion: WFL      Strength: (FRIDA Dynamometer in lbs.) Average 3 trials, Position II  R: 75.9  L: 65.1     Quick Dash: 22.7% 0%    Assessment:    Patient tolerated session again this day with no reports of increase in pain. Patient completed digi flex and extend with minimal difficulty reported per patient. Pt objective measurements taken and goals updated below. Pt to be discharged from skilled OT services as pt has met all goals and has progressed to [facility].    Treatment Diagnosis/ Problem List LUE Decreased ROM, Decreased  strength, Decreased functional hand use, Increased pain, and Diminished/Impaired Coordination     Short Term Goals:   1) Patient to be IND with HEP and modalities for pain/edema managment. MET  2) Increase ROM 3-5 degrees to increase functional hand use for ADLs/work/leisure activities. MET  3) Increase  strength 3-5 lbs. to improve functional grasp for ADLs/work/leisure activities. MET  4) Decrease complaints of pain to  1 out of 10 to increase functional hand use for ADL/work/leisure activities. MET  5) Pt will increase ability to move/handle objects AEB decreased score on QuickDASH <=11.4 % (currently 0%)  MET       Plan  Allison Dailey to be discharged from Occupational Therapy as pt has achieved the established goals and has progressed to [facility].

## 2023-04-11 ENCOUNTER — TELEPHONE (OUTPATIENT)
Dept: REHABILITATION | Facility: HOSPITAL | Age: 38
End: 2023-04-11
Payer: MEDICAID